# Patient Record
Sex: FEMALE | Race: WHITE | NOT HISPANIC OR LATINO | Employment: FULL TIME | URBAN - METROPOLITAN AREA
[De-identification: names, ages, dates, MRNs, and addresses within clinical notes are randomized per-mention and may not be internally consistent; named-entity substitution may affect disease eponyms.]

---

## 2018-08-05 ENCOUNTER — OFFICE VISIT (OUTPATIENT)
Dept: RETAIL CLINIC | Facility: CLINIC | Age: 58
End: 2018-08-05

## 2018-08-05 VITALS
TEMPERATURE: 99.1 F | SYSTOLIC BLOOD PRESSURE: 122 MMHG | OXYGEN SATURATION: 94 % | DIASTOLIC BLOOD PRESSURE: 70 MMHG | RESPIRATION RATE: 18 BRPM | HEART RATE: 83 BPM

## 2018-08-05 DIAGNOSIS — J06.9 ACUTE URI: ICD-10-CM

## 2018-08-05 DIAGNOSIS — H65.03 BILATERAL ACUTE SEROUS OTITIS MEDIA, RECURRENCE NOT SPECIFIED: ICD-10-CM

## 2018-08-05 DIAGNOSIS — J40 BRONCHITIS: Primary | ICD-10-CM

## 2018-08-05 PROCEDURE — 99203 OFFICE O/P NEW LOW 30 MIN: CPT | Performed by: NURSE PRACTITIONER

## 2018-08-05 RX ORDER — ESCITALOPRAM OXALATE 10 MG/1
10 TABLET ORAL DAILY
COMMUNITY

## 2018-08-05 RX ORDER — MONTELUKAST SODIUM 4 MG/1
4 TABLET, CHEWABLE ORAL NIGHTLY
COMMUNITY
End: 2019-11-19

## 2018-08-05 RX ORDER — ALBUTEROL SULFATE 90 UG/1
2 AEROSOL, METERED RESPIRATORY (INHALATION) EVERY 4 HOURS PRN
Qty: 1 INHALER | Refills: 0 | Status: SHIPPED | OUTPATIENT
Start: 2018-08-05 | End: 2018-09-04

## 2018-08-05 RX ORDER — SIMVASTATIN 5 MG
5 TABLET ORAL NIGHTLY
COMMUNITY
End: 2019-11-19

## 2018-08-05 NOTE — PROGRESS NOTES
Subjective   Patient ID: Amanda Tom is a 58 y.o. female presents with   Chief Complaint   Patient presents with   • Bronchitis     3 days       Bronchitis   This is a new problem. The current episode started in the past 7 days (2d). The problem occurs constantly. The problem has been gradually worsening. Associated symptoms include chills, congestion, coughing (green), diaphoresis, fatigue, a fever (low grade), headaches and nausea (from drainage). Pertinent negatives include no sore throat or vomiting. She has tried nothing for the symptoms. The treatment provided no relief.       No Known Allergies    The following portions of the patient's history were reviewed and updated as appropriate: allergies, current medications, past family history, past medical history, past social history, past surgical history and problem list.      Review of Systems   Constitutional: Positive for chills, diaphoresis, fatigue and fever (low grade).   HENT: Positive for congestion, postnasal drip, rhinorrhea, sinus pain, sinus pressure and sneezing. Negative for ear pain and sore throat.    Respiratory: Positive for cough (green), chest tightness, shortness of breath and wheezing.    Cardiovascular: Negative.    Gastrointestinal: Positive for nausea (from drainage). Negative for diarrhea and vomiting.   Neurological: Positive for headaches.       Objective     Vitals:    08/05/18 1700   BP: 122/70   Pulse: 83   Resp: 18   Temp: 99.1 °F (37.3 °C)   SpO2: 94%         Physical Exam   Constitutional: She is oriented to person, place, and time. She appears well-developed and well-nourished. She does not appear ill. No distress.   HENT:   Head: Normocephalic.   Right Ear: Hearing, external ear and ear canal normal. A middle ear effusion is present.   Left Ear: Hearing, external ear and ear canal normal. A middle ear effusion is present.   Nose: Mucosal edema and sinus tenderness present. No rhinorrhea. Right sinus exhibits frontal sinus  tenderness. Left sinus exhibits frontal sinus tenderness.   Mouth/Throat: Oropharynx is clear and moist and mucous membranes are normal. No tonsillar exudate.   Eyes: Conjunctivae are normal.   Sclera white.   Neck: No tracheal deviation present.   Cardiovascular: Normal rate, regular rhythm, S1 normal, S2 normal and normal heart sounds.    Pulmonary/Chest: Effort normal. No accessory muscle usage. No respiratory distress. She has decreased breath sounds (mild) in the right upper field, the right middle field, the right lower field, the left upper field, the left middle field and the left lower field. She has no wheezes. She has no rhonchi. She has no rales.   Abdominal: Soft. Bowel sounds are normal. There is no tenderness.   Lymphadenopathy:     She has no cervical adenopathy.   Neurological: She is alert and oriented to person, place, and time.   Skin: Skin is warm and dry.   Vitals reviewed.        Amanda was seen today for bronchitis.    Diagnoses and all orders for this visit:    Bronchitis  -     PredniSONE 5 MG tablet therapy pack dosepak; Take as directed on package  -     albuterol (PROVENTIL HFA;VENTOLIN HFA) 108 (90 Base) MCG/ACT inhaler; Inhale 2 puffs Every 4 (Four) Hours As Needed for Wheezing or Shortness of Air for up to 30 days.    Acute URI    Bilateral acute serous otitis media, recurrence not specified  -     PredniSONE 5 MG tablet therapy pack dosepak; Take as directed on package        Follow-up with Primary Care Physician in 48-72 hours if these symptoms worsen or fail to improve as anticipated. Patient verbalizes understanding.    Otitis Media With Effusion  Otitis media with effusion is the presence of fluid in the middle ear. This is a common problem in children, which often follows ear infections. It may be present for weeks or longer after the infection. Unlike an acute ear infection, otitis media with effusion refers only to fluid behind the ear drum and not infection. Children with  "repeated ear and sinus infections and allergy problems are the most likely to get otitis media with effusion.  CAUSES   The most frequent cause of the fluid buildup is dysfunction of the eustachian tubes. These are the tubes that drain fluid in the ears to the back of the nose (nasopharynx).  SYMPTOMS   · The main symptom of this condition is hearing loss. As a result, you or your child may:  ¨ Listen to the TV at a loud volume.  ¨ Not respond to questions.  ¨ Ask \"what\" often when spoken to.  ¨ Mistake or confuse one sound or word for another.  · There may be a sensation of fullness or pressure but usually not pain.  DIAGNOSIS   · Your health care provider will diagnose this condition by examining you or your child's ears.  · Your health care provider may test the pressure in you or your child's ear with a tympanometer.  · A hearing test may be conducted if the problem persists.  TREATMENT   · Treatment depends on the duration and the effects of the effusion.  · Antibiotics, decongestants, nose drops, and cortisone-type drugs (tablets or nasal spray) may not be helpful.  · Children with persistent ear effusions may have delayed language or behavioral problems. Children at risk for developmental delays in hearing, learning, and speech may require referral to a specialist earlier than children not at risk.  · You or your child's health care provider may suggest a referral to an ear, nose, and throat surgeon for treatment. The following may help restore normal hearing:  ¨ Drainage of fluid.  ¨ Placement of ear tubes (tympanostomy tubes).  ¨ Removal of adenoids (adenoidectomy).  HOME CARE INSTRUCTIONS   · Avoid secondhand smoke.  · Infants who are  are less likely to have this condition.  · Avoid feeding infants while they are lying flat.  · Avoid known environmental allergens.  · Avoid people who are sick.  SEEK MEDICAL CARE IF:   · Hearing is not better in 3 months.  · Hearing is worse.  · Ear " "pain.  · Drainage from the ear.  · Dizziness.  MAKE SURE YOU:   · Understand these instructions.  · Will watch your condition.  · Will get help right away if you are not doing well or get worse.  This information is not intended to replace advice given to you by your health care provider. Make sure you discuss any questions you have with your health care provider.  Document Released: 01/25/2006 Document Revised: 01/08/2016 Document Reviewed: 07/15/2014  [employer] Interactive Patient Education © 2017 [employer]  Upper Respiratory Infection, Adult  Most upper respiratory infections (URIs) are a viral infection of the air passages leading to the lungs. A URI affects the nose, throat, and upper air passages. The most common type of URI is nasopharyngitis and is typically referred to as \"the common cold.\"  URIs run their course and usually go away on their own. Most of the time, a URI does not require medical attention, but sometimes a bacterial infection in the upper airways can follow a viral infection. This is called a secondary infection. Sinus and middle ear infections are common types of secondary upper respiratory infections.  Bacterial pneumonia can also complicate a URI. A URI can worsen asthma and chronic obstructive pulmonary disease (COPD). Sometimes, these complications can require emergency medical care and may be life threatening.  What are the causes?  Almost all URIs are caused by viruses. A virus is a type of germ and can spread from one person to another.  What increases the risk?  You may be at risk for a URI if:  · You smoke.  · You have chronic heart or lung disease.  · You have a weakened defense (immune) system.  · You are very young or very old.  · You have nasal allergies or asthma.  · You work in crowded or poorly ventilated areas.  · You work in health care facilities or schools.    What are the signs or symptoms?  Symptoms typically develop 2-3 days after you come in contact with a cold " virus. Most viral URIs last 7-10 days. However, viral URIs from the influenza virus (flu virus) can last 14-18 days and are typically more severe. Symptoms may include:  · Runny or stuffy (congested) nose.  · Sneezing.  · Cough.  · Sore throat.  · Headache.  · Fatigue.  · Fever.  · Loss of appetite.  · Pain in your forehead, behind your eyes, and over your cheekbones (sinus pain).  · Muscle aches.    How is this diagnosed?  Your health care provider may diagnose a URI by:  · Physical exam.  · Tests to check that your symptoms are not due to another condition such as:  ? Strep throat.  ? Sinusitis.  ? Pneumonia.  ? Asthma.    How is this treated?  A URI goes away on its own with time. It cannot be cured with medicines, but medicines may be prescribed or recommended to relieve symptoms. Medicines may help:  · Reduce your fever.  · Reduce your cough.  · Relieve nasal congestion.    Follow these instructions at home:  · Take medicines only as directed by your health care provider.  · Gargle warm saltwater or take cough drops to comfort your throat as directed by your health care provider.  · Use a warm mist humidifier or inhale steam from a shower to increase air moisture. This may make it easier to breathe.  · Drink enough fluid to keep your urine clear or pale yellow.  · Eat soups and other clear broths and maintain good nutrition.  · Rest as needed.  · Return to work when your temperature has returned to normal or as your health care provider advises. You may need to stay home longer to avoid infecting others. You can also use a face mask and careful hand washing to prevent spread of the virus.  · Increase the usage of your inhaler if you have asthma.  · Do not use any tobacco products, including cigarettes, chewing tobacco, or electronic cigarettes. If you need help quitting, ask your health care provider.  How is this prevented?  The best way to protect yourself from getting a cold is to practice good  hygiene.  · Avoid oral or hand contact with people with cold symptoms.  · Wash your hands often if contact occurs.    There is no clear evidence that vitamin C, vitamin E, echinacea, or exercise reduces the chance of developing a cold. However, it is always recommended to get plenty of rest, exercise, and practice good nutrition.  Contact a health care provider if:  · You are getting worse rather than better.  · Your symptoms are not controlled by medicine.  · You have chills.  · You have worsening shortness of breath.  · You have brown or red mucus.  · You have yellow or brown nasal discharge.  · You have pain in your face, especially when you bend forward.  · You have a fever.  · You have swollen neck glands.  · You have pain while swallowing.  · You have white areas in the back of your throat.  Get help right away if:  · You have severe or persistent:  ? Headache.  ? Ear pain.  ? Sinus pain.  ? Chest pain.  · You have chronic lung disease and any of the following:  ? Wheezing.  ? Prolonged cough.  ? Coughing up blood.  ? A change in your usual mucus.  · You have a stiff neck.  · You have changes in your:  ? Vision.  ? Hearing.  ? Thinking.  ? Mood.  This information is not intended to replace advice given to you by your health care provider. Make sure you discuss any questions you have with your health care provider.  Document Released: 06/13/2002 Document Revised: 08/20/2017 Document Reviewed: 03/25/2015  "[employer] Interactive Patient Education © 2018 "[employer]  Acute Bronchitis, Adult  Acute bronchitis is sudden (acute) swelling of the air tubes (bronchi) in the lungs. Acute bronchitis causes these tubes to fill with mucus, which can make it hard to breathe. It can also cause coughing or wheezing.  In adults, acute bronchitis usually goes away within 2 weeks. A cough caused by bronchitis may last up to 3 weeks. Smoking, allergies, and asthma can make the condition worse. Repeated episodes of bronchitis may  cause further lung problems, such as chronic obstructive pulmonary disease (COPD).  What are the causes?  This condition can be caused by germs and by substances that irritate the lungs, including:  · Cold and flu viruses. This condition is most often caused by the same virus that causes a cold.  · Bacteria.  · Exposure to tobacco smoke, dust, fumes, and air pollution.    What increases the risk?  This condition is more likely to develop in people who:  · Have close contact with someone with acute bronchitis.  · Are exposed to lung irritants, such as tobacco smoke, dust, fumes, and vapors.  · Have a weak immune system.  · Have a respiratory condition such as asthma.    What are the signs or symptoms?  Symptoms of this condition include:  · A cough.  · Coughing up clear, yellow, or green mucus.  · Wheezing.  · Chest congestion.  · Shortness of breath.  · A fever.  · Body aches.  · Chills.  · A sore throat.    How is this diagnosed?  This condition is usually diagnosed with a physical exam. During the exam, your health care provider may order tests, such as chest X-rays, to rule out other conditions. He or she may also:  · Test a sample of your mucus for bacterial infection.  · Check the level of oxygen in your blood. This is done to check for pneumonia.  · Do a chest X-ray or lung function testing to rule out pneumonia and other conditions.  · Perform blood tests.    Your health care provider will also ask about your symptoms and medical history.  How is this treated?  Most cases of acute bronchitis clear up over time without treatment. Your health care provider may recommend:  · Drinking more fluids. Drinking more makes your mucus thinner, which may make it easier to breathe.  · Taking a medicine for a fever or cough.  · Taking an antibiotic medicine.  · Using an inhaler to help improve shortness of breath and to control a cough.  · Using a cool mist vaporizer or humidifier to make it easier to breathe.    Follow  these instructions at home:  Medicines  · Take over-the-counter and prescription medicines only as told by your health care provider.  · If you were prescribed an antibiotic, take it as told by your health care provider. Do not stop taking the antibiotic even if you start to feel better.  General instructions  · Get plenty of rest.  · Drink enough fluids to keep your urine clear or pale yellow.  · Avoid smoking and secondhand smoke. Exposure to cigarette smoke or irritating chemicals will make bronchitis worse. If you smoke and you need help quitting, ask your health care provider. Quitting smoking will help your lungs heal faster.  · Use an inhaler, cool mist vaporizer, or humidifier as told by your health care provider.  · Keep all follow-up visits as told by your health care provider. This is important.  How is this prevented?  To lower your risk of getting this condition again:  · Wash your hands often with soap and water. If soap and water are not available, use hand .  · Avoid contact with people who have cold symptoms.  · Try not to touch your hands to your mouth, nose, or eyes.  · Make sure to get the flu shot every year.    Contact a health care provider if:  · Your symptoms do not improve in 2 weeks of treatment.  Get help right away if:  · You cough up blood.  · You have chest pain.  · You have severe shortness of breath.  · You become dehydrated.  · You faint or keep feeling like you are going to faint.  · You keep vomiting.  · You have a severe headache.  · Your fever or chills gets worse.  This information is not intended to replace advice given to you by your health care provider. Make sure you discuss any questions you have with your health care provider.  Document Released: 01/25/2006 Document Revised: 07/12/2017 Document Reviewed: 06/07/2017  [employer] Interactive Patient Education © 2018 [employer]

## 2018-08-05 NOTE — PATIENT INSTRUCTIONS
"Otitis Media With Effusion  Otitis media with effusion is the presence of fluid in the middle ear. This is a common problem in children, which often follows ear infections. It may be present for weeks or longer after the infection. Unlike an acute ear infection, otitis media with effusion refers only to fluid behind the ear drum and not infection. Children with repeated ear and sinus infections and allergy problems are the most likely to get otitis media with effusion.  CAUSES   The most frequent cause of the fluid buildup is dysfunction of the eustachian tubes. These are the tubes that drain fluid in the ears to the back of the nose (nasopharynx).  SYMPTOMS   · The main symptom of this condition is hearing loss. As a result, you or your child may:  ¨ Listen to the TV at a loud volume.  ¨ Not respond to questions.  ¨ Ask \"what\" often when spoken to.  ¨ Mistake or confuse one sound or word for another.  · There may be a sensation of fullness or pressure but usually not pain.  DIAGNOSIS   · Your health care provider will diagnose this condition by examining you or your child's ears.  · Your health care provider may test the pressure in you or your child's ear with a tympanometer.  · A hearing test may be conducted if the problem persists.  TREATMENT   · Treatment depends on the duration and the effects of the effusion.  · Antibiotics, decongestants, nose drops, and cortisone-type drugs (tablets or nasal spray) may not be helpful.  · Children with persistent ear effusions may have delayed language or behavioral problems. Children at risk for developmental delays in hearing, learning, and speech may require referral to a specialist earlier than children not at risk.  · You or your child's health care provider may suggest a referral to an ear, nose, and throat surgeon for treatment. The following may help restore normal hearing:  ¨ Drainage of fluid.  ¨ Placement of ear tubes (tympanostomy tubes).  ¨ Removal of adenoids " "(adenoidectomy).  HOME CARE INSTRUCTIONS   · Avoid secondhand smoke.  · Infants who are  are less likely to have this condition.  · Avoid feeding infants while they are lying flat.  · Avoid known environmental allergens.  · Avoid people who are sick.  SEEK MEDICAL CARE IF:   · Hearing is not better in 3 months.  · Hearing is worse.  · Ear pain.  · Drainage from the ear.  · Dizziness.  MAKE SURE YOU:   · Understand these instructions.  · Will watch your condition.  · Will get help right away if you are not doing well or get worse.  This information is not intended to replace advice given to you by your health care provider. Make sure you discuss any questions you have with your health care provider.  Document Released: 01/25/2006 Document Revised: 01/08/2016 Document Reviewed: 07/15/2014  [employer] Interactive Patient Education © 2017 [employer]  Upper Respiratory Infection, Adult  Most upper respiratory infections (URIs) are a viral infection of the air passages leading to the lungs. A URI affects the nose, throat, and upper air passages. The most common type of URI is nasopharyngitis and is typically referred to as \"the common cold.\"  URIs run their course and usually go away on their own. Most of the time, a URI does not require medical attention, but sometimes a bacterial infection in the upper airways can follow a viral infection. This is called a secondary infection. Sinus and middle ear infections are common types of secondary upper respiratory infections.  Bacterial pneumonia can also complicate a URI. A URI can worsen asthma and chronic obstructive pulmonary disease (COPD). Sometimes, these complications can require emergency medical care and may be life threatening.  What are the causes?  Almost all URIs are caused by viruses. A virus is a type of germ and can spread from one person to another.  What increases the risk?  You may be at risk for a URI if:  · You smoke.  · You have chronic heart or " lung disease.  · You have a weakened defense (immune) system.  · You are very young or very old.  · You have nasal allergies or asthma.  · You work in crowded or poorly ventilated areas.  · You work in health care facilities or schools.    What are the signs or symptoms?  Symptoms typically develop 2-3 days after you come in contact with a cold virus. Most viral URIs last 7-10 days. However, viral URIs from the influenza virus (flu virus) can last 14-18 days and are typically more severe. Symptoms may include:  · Runny or stuffy (congested) nose.  · Sneezing.  · Cough.  · Sore throat.  · Headache.  · Fatigue.  · Fever.  · Loss of appetite.  · Pain in your forehead, behind your eyes, and over your cheekbones (sinus pain).  · Muscle aches.    How is this diagnosed?  Your health care provider may diagnose a URI by:  · Physical exam.  · Tests to check that your symptoms are not due to another condition such as:  ? Strep throat.  ? Sinusitis.  ? Pneumonia.  ? Asthma.    How is this treated?  A URI goes away on its own with time. It cannot be cured with medicines, but medicines may be prescribed or recommended to relieve symptoms. Medicines may help:  · Reduce your fever.  · Reduce your cough.  · Relieve nasal congestion.    Follow these instructions at home:  · Take medicines only as directed by your health care provider.  · Gargle warm saltwater or take cough drops to comfort your throat as directed by your health care provider.  · Use a warm mist humidifier or inhale steam from a shower to increase air moisture. This may make it easier to breathe.  · Drink enough fluid to keep your urine clear or pale yellow.  · Eat soups and other clear broths and maintain good nutrition.  · Rest as needed.  · Return to work when your temperature has returned to normal or as your health care provider advises. You may need to stay home longer to avoid infecting others. You can also use a face mask and careful hand washing to prevent  spread of the virus.  · Increase the usage of your inhaler if you have asthma.  · Do not use any tobacco products, including cigarettes, chewing tobacco, or electronic cigarettes. If you need help quitting, ask your health care provider.  How is this prevented?  The best way to protect yourself from getting a cold is to practice good hygiene.  · Avoid oral or hand contact with people with cold symptoms.  · Wash your hands often if contact occurs.    There is no clear evidence that vitamin C, vitamin E, echinacea, or exercise reduces the chance of developing a cold. However, it is always recommended to get plenty of rest, exercise, and practice good nutrition.  Contact a health care provider if:  · You are getting worse rather than better.  · Your symptoms are not controlled by medicine.  · You have chills.  · You have worsening shortness of breath.  · You have brown or red mucus.  · You have yellow or brown nasal discharge.  · You have pain in your face, especially when you bend forward.  · You have a fever.  · You have swollen neck glands.  · You have pain while swallowing.  · You have white areas in the back of your throat.  Get help right away if:  · You have severe or persistent:  ? Headache.  ? Ear pain.  ? Sinus pain.  ? Chest pain.  · You have chronic lung disease and any of the following:  ? Wheezing.  ? Prolonged cough.  ? Coughing up blood.  ? A change in your usual mucus.  · You have a stiff neck.  · You have changes in your:  ? Vision.  ? Hearing.  ? Thinking.  ? Mood.  This information is not intended to replace advice given to you by your health care provider. Make sure you discuss any questions you have with your health care provider.  Document Released: 06/13/2002 Document Revised: 08/20/2017 Document Reviewed: 03/25/2015  [employer] Interactive Patient Education © 2018 [employer]  Acute Bronchitis, Adult  Acute bronchitis is sudden (acute) swelling of the air tubes (bronchi) in the lungs. Acute  bronchitis causes these tubes to fill with mucus, which can make it hard to breathe. It can also cause coughing or wheezing.  In adults, acute bronchitis usually goes away within 2 weeks. A cough caused by bronchitis may last up to 3 weeks. Smoking, allergies, and asthma can make the condition worse. Repeated episodes of bronchitis may cause further lung problems, such as chronic obstructive pulmonary disease (COPD).  What are the causes?  This condition can be caused by germs and by substances that irritate the lungs, including:  · Cold and flu viruses. This condition is most often caused by the same virus that causes a cold.  · Bacteria.  · Exposure to tobacco smoke, dust, fumes, and air pollution.    What increases the risk?  This condition is more likely to develop in people who:  · Have close contact with someone with acute bronchitis.  · Are exposed to lung irritants, such as tobacco smoke, dust, fumes, and vapors.  · Have a weak immune system.  · Have a respiratory condition such as asthma.    What are the signs or symptoms?  Symptoms of this condition include:  · A cough.  · Coughing up clear, yellow, or green mucus.  · Wheezing.  · Chest congestion.  · Shortness of breath.  · A fever.  · Body aches.  · Chills.  · A sore throat.    How is this diagnosed?  This condition is usually diagnosed with a physical exam. During the exam, your health care provider may order tests, such as chest X-rays, to rule out other conditions. He or she may also:  · Test a sample of your mucus for bacterial infection.  · Check the level of oxygen in your blood. This is done to check for pneumonia.  · Do a chest X-ray or lung function testing to rule out pneumonia and other conditions.  · Perform blood tests.    Your health care provider will also ask about your symptoms and medical history.  How is this treated?  Most cases of acute bronchitis clear up over time without treatment. Your health care provider may  recommend:  · Drinking more fluids. Drinking more makes your mucus thinner, which may make it easier to breathe.  · Taking a medicine for a fever or cough.  · Taking an antibiotic medicine.  · Using an inhaler to help improve shortness of breath and to control a cough.  · Using a cool mist vaporizer or humidifier to make it easier to breathe.    Follow these instructions at home:  Medicines  · Take over-the-counter and prescription medicines only as told by your health care provider.  · If you were prescribed an antibiotic, take it as told by your health care provider. Do not stop taking the antibiotic even if you start to feel better.  General instructions  · Get plenty of rest.  · Drink enough fluids to keep your urine clear or pale yellow.  · Avoid smoking and secondhand smoke. Exposure to cigarette smoke or irritating chemicals will make bronchitis worse. If you smoke and you need help quitting, ask your health care provider. Quitting smoking will help your lungs heal faster.  · Use an inhaler, cool mist vaporizer, or humidifier as told by your health care provider.  · Keep all follow-up visits as told by your health care provider. This is important.  How is this prevented?  To lower your risk of getting this condition again:  · Wash your hands often with soap and water. If soap and water are not available, use hand .  · Avoid contact with people who have cold symptoms.  · Try not to touch your hands to your mouth, nose, or eyes.  · Make sure to get the flu shot every year.    Contact a health care provider if:  · Your symptoms do not improve in 2 weeks of treatment.  Get help right away if:  · You cough up blood.  · You have chest pain.  · You have severe shortness of breath.  · You become dehydrated.  · You faint or keep feeling like you are going to faint.  · You keep vomiting.  · You have a severe headache.  · Your fever or chills gets worse.  This information is not intended to replace advice given  to you by your health care provider. Make sure you discuss any questions you have with your health care provider.  Document Released: 01/25/2006 Document Revised: 07/12/2017 Document Reviewed: 06/07/2017  [employer] Interactive Patient Education © 2018 [employer]

## 2018-09-10 ENCOUNTER — OFFICE VISIT (OUTPATIENT)
Dept: RETAIL CLINIC | Facility: CLINIC | Age: 58
End: 2018-09-10

## 2018-09-10 VITALS
SYSTOLIC BLOOD PRESSURE: 116 MMHG | HEART RATE: 70 BPM | OXYGEN SATURATION: 97 % | RESPIRATION RATE: 18 BRPM | DIASTOLIC BLOOD PRESSURE: 80 MMHG | TEMPERATURE: 98.4 F

## 2018-09-10 DIAGNOSIS — H66.91 ACUTE RIGHT OTITIS MEDIA: ICD-10-CM

## 2018-09-10 DIAGNOSIS — H60.502 ACUTE OTITIS EXTERNA OF LEFT EAR, UNSPECIFIED TYPE: Primary | ICD-10-CM

## 2018-09-10 DIAGNOSIS — J30.9 ACUTE ALLERGIC RHINITIS, UNSPECIFIED SEASONALITY, UNSPECIFIED TRIGGER: ICD-10-CM

## 2018-09-10 PROCEDURE — 99213 OFFICE O/P EST LOW 20 MIN: CPT | Performed by: NURSE PRACTITIONER

## 2018-09-10 RX ORDER — AMOXICILLIN 875 MG/1
875 TABLET, COATED ORAL EVERY 12 HOURS SCHEDULED
Qty: 20 TABLET | Refills: 0 | Status: SHIPPED | OUTPATIENT
Start: 2018-09-10 | End: 2018-09-20

## 2018-09-10 RX ORDER — FEXOFENADINE HCL AND PSEUDOEPHEDRINE HCI 60; 120 MG/1; MG/1
1 TABLET, EXTENDED RELEASE ORAL 2 TIMES DAILY
COMMUNITY
End: 2019-11-19

## 2018-09-10 RX ORDER — OFLOXACIN 3 MG/ML
10 SOLUTION AURICULAR (OTIC) DAILY
Qty: 10 ML | Refills: 0 | Status: SHIPPED | OUTPATIENT
Start: 2018-09-10 | End: 2018-09-17

## 2018-09-10 NOTE — PATIENT INSTRUCTIONS
"Otitis Externa  Otitis externa is an infection of the outer ear canal. The outer ear canal is the area between the outside of the ear and the eardrum. Otitis externa is sometimes called \"swimmer's ear.\"  What are the causes?  This condition may be caused by:  · Swimming in dirty water.  · Moisture in the ear.  · An injury to the inside of the ear.  · An object stuck in the ear.  · A cut or scrape on the outside of the ear.    What increases the risk?  This condition is more likely to develop in swimmers.  What are the signs or symptoms?  The first symptom of this condition is often itching in the ear. Later signs and symptoms include:  · Swelling of the ear.  · Redness in the ear.  · Ear pain. The pain may get worse when you pull on your ear.  · Pus coming from the ear.    How is this diagnosed?  This condition may be diagnosed by examining the ear and testing fluid from the ear for bacteria and funguses.  How is this treated?  This condition may be treated with:  · Antibiotic ear drops. These are often given for 10-14 days.  · Medicine to reduce itching and swelling.    Follow these instructions at home:  · If you were prescribed antibiotic ear drops, apply them as told by your health care provider. Do not stop using the antibiotic even if your condition improves.  · Take over-the-counter and prescription medicines only as told by your health care provider.  · Keep all follow-up visits as told by your health care provider. This is important.  How is this prevented?  · Keep your ear dry. Use the corner of a towel to dry your ear after you swim or bathe.  · Avoid scratching or putting things in your ear. Doing these things can damage the ear canal or remove the protective wax that lines it, which makes it easier for bacteria and funguses to grow.  · Avoid swimming in lakes, polluted water, or pools that may not have the right amount of chlorine.  · Consider making ear drops and putting 3 or 4 drops in each ear after " you swim. Ask your health care provider about how you can make ear drops.  Contact a health care provider if:  · You have a fever.  · After 3 days your ear is still red, swollen, painful, or draining pus.  · Your redness, swelling, or pain gets worse.  · You have a severe headache.  · You have redness, swelling, pain, or tenderness in the area behind your ear.  This information is not intended to replace advice given to you by your health care provider. Make sure you discuss any questions you have with your health care provider.  Document Released: 12/18/2006 Document Revised: 01/24/2017 Document Reviewed: 09/26/2016  [employer] Interactive Patient Education © 2018 [employer]  Otitis Media, Adult  Otitis media occurs when there is inflammation and fluid in the middle ear. Your middle ear is a part of the ear that contains bones for hearing as well as air that helps send sounds to your brain.  What are the causes?  This condition is caused by a blockage in the eustachian tube. This tube drains fluid from the ear to the back of the nose (nasopharynx). A blockage in this tube can be caused by an object or by swelling (edema) in the tube. Problems that can cause a blockage include:  · A cold or other upper respiratory infection.  · Allergies.  · An irritant, such as tobacco smoke.  · Enlarged adenoids. The adenoids are areas of soft tissue located high in the back of the throat, behind the nose and the roof of the mouth.  · A mass in the nasopharynx.  · Damage to the ear caused by pressure changes (barotrauma).    What are the signs or symptoms?  Symptoms of this condition include:  · Ear pain.  · A fever.  · Decreased hearing.  · A headache.  · Tiredness (lethargy).  · Fluid leaking from the ear.  · Ringing in the ear.    How is this diagnosed?  This condition is diagnosed with a physical exam. During the exam your health care provider will use an instrument called an otoscope to look into your ear and check for  redness, swelling, and fluid. He or she will also ask about your symptoms.  Your health care provider may also order tests, such as:  · A test to check the movement of the eardrum (pneumatic otoscopy). This test is done by squeezing a small amount of air into the ear.  · A test that changes air pressure in the middle ear to check how well the eardrum moves and whether the eustachian tube is working (tympanogram).    How is this treated?  This condition usually goes away on its own within 3-5 days. But if the condition is caused by a bacteria infection and does not go away own its own, or keeps coming back, your health care provider may:  · Prescribe antibiotic medicines to treat the infection.  · Prescribe or recommend medicines to control pain.    Follow these instructions at home:  · Take over-the-counter and prescription medicines only as told by your health care provider.  · If you were prescribed an antibiotic medicine, take it as told by your health care provider. Do not stop taking the antibiotic even if you start to feel better.  · Keep all follow-up visits as told by your health care provider. This is important.  Contact a health care provider if:  · You have bleeding from your nose.  · There is a lump on your neck.  · You are not getting better in 5 days.  · You feel worse instead of better.  Get help right away if:  · You have severe pain that is not controlled with medicine.  · You have swelling, redness, or pain around your ear.  · You have stiffness in your neck.  · A part of your face is paralyzed.  · The bone behind your ear (mastoid) is tender when you touch it.  · You develop a severe headache.  Summary  · Otitis media is redness, soreness, and swelling of the middle ear.  · This condition usually goes away on its own within 3-5 days.  · If the problem does not go away in 3-5 days, your health care provider may prescribe or recommend medicines to treat your symptoms.  · If you were prescribed an  antibiotic medicine, take it as told by your health care provider.  This information is not intended to replace advice given to you by your health care provider. Make sure you discuss any questions you have with your health care provider.  Document Released: 09/22/2005 Document Revised: 12/08/2017 Document Reviewed: 12/08/2017  [employer] Interactive Patient Education © 2018 [employer]

## 2018-09-10 NOTE — PROGRESS NOTES
Subjective   Patient ID: Amanda Tom is a 58 y.o. female presents with   Chief Complaint   Patient presents with   • Earache       Earache    There is pain in the left ear. This is a new problem. The current episode started in the past 7 days. The problem occurs constantly. The problem has been gradually worsening. There has been no fever. The pain is at a severity of 6/10. Associated symptoms include headaches, rhinorrhea and a sore throat. Treatments tried: allegra d. The treatment provided no relief. Her past medical history is significant for hearing loss. There is no history of a chronic ear infection or a tympanostomy tube.       No Known Allergies    The following portions of the patient's history were reviewed and updated as appropriate: allergies, current medications, past family history, past medical history, past social history, past surgical history and problem list.      Review of Systems   Constitutional: Negative.    HENT: Positive for congestion, ear pain, postnasal drip, rhinorrhea, sinus pressure, sneezing and sore throat.    Respiratory: Negative.    Cardiovascular: Negative.    Gastrointestinal: Negative.    Neurological: Positive for headaches.       Objective     Vitals:    09/10/18 1744   BP: 116/80   Pulse: 70   Resp: 18   Temp: 98.4 °F (36.9 °C)   SpO2: 97%         Physical Exam   Constitutional: She is oriented to person, place, and time. She appears well-developed and well-nourished. She does not appear ill. No distress.   HENT:   Head: Normocephalic.   Right Ear: Hearing, external ear and ear canal normal. Tympanic membrane is erythematous.   Left Ear: External ear normal. There is drainage, swelling and tenderness. Decreased hearing is noted.   Nose: Mucosal edema present. No rhinorrhea or sinus tenderness.   Mouth/Throat: Oropharynx is clear and moist and mucous membranes are normal. No tonsillar exudate.   Left ear canal with white exudate, edema, erythema, pain elicited when earlobe  "pulled. Left TM not visualized due to edema.     Eyes: Conjunctivae are normal.   Sclera white.   Neck: No tracheal deviation present.   Cardiovascular: Normal rate, regular rhythm, S1 normal, S2 normal and normal heart sounds.    Pulmonary/Chest: Effort normal and breath sounds normal. No accessory muscle usage. No respiratory distress.   Abdominal: Soft. Bowel sounds are normal. There is no tenderness.   Lymphadenopathy:     She has no cervical adenopathy.   Neurological: She is alert and oriented to person, place, and time.   Skin: Skin is warm and dry.   Vitals reviewed.        Amanda was seen today for earache.    Diagnoses and all orders for this visit:    Acute otitis externa of left ear, unspecified type  -     ofloxacin (FLOXIN OTIC) 0.3 % otic solution; Administer 10 drops into the left ear Daily for 7 days.    Acute right otitis media  -     amoxicillin (AMOXIL) 875 MG tablet; Take 1 tablet by mouth Every 12 (Twelve) Hours for 10 days.    Acute allergic rhinitis, unspecified seasonality, unspecified trigger        No water in left ear x 7 days. Follow-up with Primary Care Physician in 48-72 hours if these symptoms worsen or fail to improve as anticipated. Patient verbalizes understanding.    Otitis Externa  Otitis externa is an infection of the outer ear canal. The outer ear canal is the area between the outside of the ear and the eardrum. Otitis externa is sometimes called \"swimmer's ear.\"  What are the causes?  This condition may be caused by:  · Swimming in dirty water.  · Moisture in the ear.  · An injury to the inside of the ear.  · An object stuck in the ear.  · A cut or scrape on the outside of the ear.    What increases the risk?  This condition is more likely to develop in swimmers.  What are the signs or symptoms?  The first symptom of this condition is often itching in the ear. Later signs and symptoms include:  · Swelling of the ear.  · Redness in the ear.  · Ear pain. The pain may get worse " when you pull on your ear.  · Pus coming from the ear.    How is this diagnosed?  This condition may be diagnosed by examining the ear and testing fluid from the ear for bacteria and funguses.  How is this treated?  This condition may be treated with:  · Antibiotic ear drops. These are often given for 10-14 days.  · Medicine to reduce itching and swelling.    Follow these instructions at home:  · If you were prescribed antibiotic ear drops, apply them as told by your health care provider. Do not stop using the antibiotic even if your condition improves.  · Take over-the-counter and prescription medicines only as told by your health care provider.  · Keep all follow-up visits as told by your health care provider. This is important.  How is this prevented?  · Keep your ear dry. Use the corner of a towel to dry your ear after you swim or bathe.  · Avoid scratching or putting things in your ear. Doing these things can damage the ear canal or remove the protective wax that lines it, which makes it easier for bacteria and funguses to grow.  · Avoid swimming in lakes, polluted water, or pools that may not have the right amount of chlorine.  · Consider making ear drops and putting 3 or 4 drops in each ear after you swim. Ask your health care provider about how you can make ear drops.  Contact a health care provider if:  · You have a fever.  · After 3 days your ear is still red, swollen, painful, or draining pus.  · Your redness, swelling, or pain gets worse.  · You have a severe headache.  · You have redness, swelling, pain, or tenderness in the area behind your ear.  This information is not intended to replace advice given to you by your health care provider. Make sure you discuss any questions you have with your health care provider.  Document Released: 12/18/2006 Document Revised: 01/24/2017 Document Reviewed: 09/26/2016  [employer] Interactive Patient Education © 2018 [employer]  Otitis Media, Adult  Otitis media  occurs when there is inflammation and fluid in the middle ear. Your middle ear is a part of the ear that contains bones for hearing as well as air that helps send sounds to your brain.  What are the causes?  This condition is caused by a blockage in the eustachian tube. This tube drains fluid from the ear to the back of the nose (nasopharynx). A blockage in this tube can be caused by an object or by swelling (edema) in the tube. Problems that can cause a blockage include:  · A cold or other upper respiratory infection.  · Allergies.  · An irritant, such as tobacco smoke.  · Enlarged adenoids. The adenoids are areas of soft tissue located high in the back of the throat, behind the nose and the roof of the mouth.  · A mass in the nasopharynx.  · Damage to the ear caused by pressure changes (barotrauma).    What are the signs or symptoms?  Symptoms of this condition include:  · Ear pain.  · A fever.  · Decreased hearing.  · A headache.  · Tiredness (lethargy).  · Fluid leaking from the ear.  · Ringing in the ear.    How is this diagnosed?  This condition is diagnosed with a physical exam. During the exam your health care provider will use an instrument called an otoscope to look into your ear and check for redness, swelling, and fluid. He or she will also ask about your symptoms.  Your health care provider may also order tests, such as:  · A test to check the movement of the eardrum (pneumatic otoscopy). This test is done by squeezing a small amount of air into the ear.  · A test that changes air pressure in the middle ear to check how well the eardrum moves and whether the eustachian tube is working (tympanogram).    How is this treated?  This condition usually goes away on its own within 3-5 days. But if the condition is caused by a bacteria infection and does not go away own its own, or keeps coming back, your health care provider may:  · Prescribe antibiotic medicines to treat the infection.  · Prescribe or  recommend medicines to control pain.    Follow these instructions at home:  · Take over-the-counter and prescription medicines only as told by your health care provider.  · If you were prescribed an antibiotic medicine, take it as told by your health care provider. Do not stop taking the antibiotic even if you start to feel better.  · Keep all follow-up visits as told by your health care provider. This is important.  Contact a health care provider if:  · You have bleeding from your nose.  · There is a lump on your neck.  · You are not getting better in 5 days.  · You feel worse instead of better.  Get help right away if:  · You have severe pain that is not controlled with medicine.  · You have swelling, redness, or pain around your ear.  · You have stiffness in your neck.  · A part of your face is paralyzed.  · The bone behind your ear (mastoid) is tender when you touch it.  · You develop a severe headache.  Summary  · Otitis media is redness, soreness, and swelling of the middle ear.  · This condition usually goes away on its own within 3-5 days.  · If the problem does not go away in 3-5 days, your health care provider may prescribe or recommend medicines to treat your symptoms.  · If you were prescribed an antibiotic medicine, take it as told by your health care provider.  This information is not intended to replace advice given to you by your health care provider. Make sure you discuss any questions you have with your health care provider.  Document Released: 09/22/2005 Document Revised: 12/08/2017 Document Reviewed: 12/08/2017  [employer] Interactive Patient Education © 2018 [employer]

## 2019-11-19 ENCOUNTER — OFFICE VISIT (OUTPATIENT)
Dept: RETAIL CLINIC | Facility: CLINIC | Age: 59
End: 2019-11-19

## 2019-11-19 VITALS
RESPIRATION RATE: 18 BRPM | OXYGEN SATURATION: 95 % | DIASTOLIC BLOOD PRESSURE: 62 MMHG | HEART RATE: 75 BPM | TEMPERATURE: 98.7 F | SYSTOLIC BLOOD PRESSURE: 110 MMHG

## 2019-11-19 DIAGNOSIS — B97.89 VIRAL RESPIRATORY ILLNESS: ICD-10-CM

## 2019-11-19 DIAGNOSIS — J98.8 VIRAL RESPIRATORY ILLNESS: ICD-10-CM

## 2019-11-19 DIAGNOSIS — H66.91 ACUTE OTITIS MEDIA, RIGHT: Primary | ICD-10-CM

## 2019-11-19 DIAGNOSIS — Z76.0 MEDICATION REFILL: ICD-10-CM

## 2019-11-19 PROBLEM — K21.9 GERD (GASTROESOPHAGEAL REFLUX DISEASE): Status: ACTIVE | Noted: 2019-02-26

## 2019-11-19 PROBLEM — E78.00 HIGH CHOLESTEROL: Status: ACTIVE | Noted: 2019-02-26

## 2019-11-19 PROBLEM — E66.01 MORBID OBESITY WITH BMI OF 40.0-44.9, ADULT (HCC): Status: ACTIVE | Noted: 2019-02-26

## 2019-11-19 PROBLEM — N39.3 STRESS INCONTINENCE: Status: ACTIVE | Noted: 2019-05-29

## 2019-11-19 PROBLEM — T14.8XXA WOUND INFECTION: Status: ACTIVE | Noted: 2019-02-26

## 2019-11-19 PROBLEM — F41.8 DEPRESSION WITH ANXIETY: Status: ACTIVE | Noted: 2019-02-26

## 2019-11-19 PROBLEM — Z99.89 OSA ON CPAP: Status: ACTIVE | Noted: 2019-01-10

## 2019-11-19 PROBLEM — G47.33 OSA ON CPAP: Status: ACTIVE | Noted: 2019-01-10

## 2019-11-19 PROBLEM — Z86.14 HISTORY OF MRSA INFECTION: Status: ACTIVE | Noted: 2019-02-26

## 2019-11-19 PROBLEM — L08.9 WOUND INFECTION: Status: ACTIVE | Noted: 2019-02-26

## 2019-11-19 PROCEDURE — 99213 OFFICE O/P EST LOW 20 MIN: CPT | Performed by: NURSE PRACTITIONER

## 2019-11-19 RX ORDER — ALBUTEROL SULFATE 90 UG/1
2 AEROSOL, METERED RESPIRATORY (INHALATION) EVERY 4 HOURS PRN
Qty: 1 INHALER | Refills: 0 | Status: SHIPPED | OUTPATIENT
Start: 2019-11-19 | End: 2019-12-19

## 2019-11-19 RX ORDER — TRIAMCINOLONE ACETONIDE 55 UG/1
2 SPRAY, METERED NASAL DAILY
COMMUNITY

## 2019-11-19 RX ORDER — BUPROPION HYDROCHLORIDE 150 MG/1
TABLET ORAL
COMMUNITY
Start: 2019-11-04

## 2019-11-19 RX ORDER — SIMVASTATIN 10 MG
TABLET ORAL
COMMUNITY
Start: 2019-11-04

## 2019-11-19 RX ORDER — CHOLECALCIFEROL (VITAMIN D3) 1250 MCG
50000 CAPSULE ORAL
COMMUNITY
Start: 2019-03-15

## 2019-11-19 RX ORDER — AMOXICILLIN 875 MG/1
875 TABLET, COATED ORAL EVERY 12 HOURS SCHEDULED
Qty: 20 TABLET | Refills: 0 | Status: SHIPPED | OUTPATIENT
Start: 2019-11-19 | End: 2019-11-29

## 2019-11-19 NOTE — PATIENT INSTRUCTIONS
Viral Respiratory Infection  A respiratory infection is an illness that affects part of the respiratory system, such as the lungs, nose, or throat. A respiratory infection that is caused by a virus is called a viral respiratory infection.  Common types of viral respiratory infections include:  · A cold.  · The flu (influenza).  · A respiratory syncytial virus (RSV) infection.  What are the causes?  This condition is caused by a virus.  What are the signs or symptoms?  Symptoms of this condition include:  · A stuffy or runny nose.  · Yellow or green nasal discharge.  · A cough.  · Sneezing.  · Fatigue.  · Achy muscles.  · A sore throat.  · Sweating or chills.  · A fever.  · A headache.  How is this diagnosed?  This condition may be diagnosed based on:  · Your symptoms.  · A physical exam.  · Testing of nasal swabs.  How is this treated?  This condition may be treated with medicines, such as:  · Antiviral medicine. This may shorten the length of time a person has symptoms.  · Expectorants. These make it easier to cough up mucus.  · Decongestant nasal sprays.  · Acetaminophen or NSAIDs to relieve fever and pain.  Antibiotic medicines are not prescribed for viral infections. This is because antibiotics are designed to kill bacteria. They are not effective against viruses.  Follow these instructions at home:    Managing pain and congestion  · Take over-the-counter and prescription medicines only as told by your health care provider.  · If you have a sore throat, gargle with a salt-water mixture 3-4 times a day or as needed. To make a salt-water mixture, completely dissolve ½-1 tsp of salt in 1 cup of warm water.  · Use nose drops made from salt water to ease congestion and soften raw skin around your nose.  · Drink enough fluid to keep your urine pale yellow. This helps prevent dehydration and helps loosen up mucus.  General instructions  · Rest as much as possible.  · Do not drink alcohol.  · Do not use any products  that contain nicotine or tobacco, such as cigarettes and e-cigarettes. If you need help quitting, ask your health care provider.  · Keep all follow-up visits as told by your health care provider. This is important.  How is this prevented?    · Get an annual flu shot. You may get the flu shot in late summer, fall, or winter. Ask your health care provider when you should get your flu shot.  · Avoid exposing others to your respiratory infection.  ? Stay home from work or school as told by your health care provider.  ? Wash your hands with soap and water often, especially after you cough or sneeze. If soap and water are not available, use alcohol-based hand .  · Avoid contact with people who are sick during cold and flu season. This is generally fall and winter.  Contact a health care provider if:  · Your symptoms last for 10 days or longer.  · Your symptoms get worse over time.  · You have a fever.  · You have severe sinus pain in your face or forehead.  · The glands in your jaw or neck become very swollen.  Get help right away if you:  · Feel pain or pressure in your chest.  · Have shortness of breath.  · Faint or feel like you will faint.  · Have severe and persistent vomiting.  · Feel confused or disoriented.  Summary  · A respiratory infection is an illness that affects part of the respiratory system, such as the lungs, nose, or throat. A respiratory infection that is caused by a virus is called a viral respiratory infection.  · Common types of viral respiratory infections are a cold, influenza, and respiratory syncytial virus (RSV) infection.  · Symptoms of this condition include a stuffy or runny nose, cough, sneezing, fatigue, achy muscles, sore throat, and fevers or chills.  · Antibiotic medicines are not prescribed for viral infections. This is because antibiotics are designed to kill bacteria. They are not effective against viruses.  This information is not intended to replace advice given to you by  your health care provider. Make sure you discuss any questions you have with your health care provider.  Document Released: 09/27/2006 Document Revised: 01/28/2019 Document Reviewed: 01/28/2019  [employer] Interactive Patient Education © 2019 [employer]  Otitis Media, Adult    Otitis media occurs when there is inflammation and fluid in the middle ear. Your middle ear is a part of the ear that contains bones for hearing as well as air that helps send sounds to your brain.  What are the causes?  This condition is caused by a blockage in the eustachian tube. This tube drains fluid from the ear to the back of the nose (nasopharynx). A blockage in this tube can be caused by an object or by swelling (edema) in the tube. Problems that can cause a blockage include:  · A cold or other upper respiratory infection.  · Allergies.  · An irritant, such as tobacco smoke.  · Enlarged adenoids. The adenoids are areas of soft tissue located high in the back of the throat, behind the nose and the roof of the mouth.  · A mass in the nasopharynx.  · Damage to the ear caused by pressure changes (barotrauma).  What are the signs or symptoms?  Symptoms of this condition include:  · Ear pain.  · A fever.  · Decreased hearing.  · A headache.  · Tiredness (lethargy).  · Fluid leaking from the ear.  · Ringing in the ear.  How is this diagnosed?  This condition is diagnosed with a physical exam. During the exam your health care provider will use an instrument called an otoscope to look into your ear and check for redness, swelling, and fluid. He or she will also ask about your symptoms.  Your health care provider may also order tests, such as:  · A test to check the movement of the eardrum (pneumatic otoscopy). This test is done by squeezing a small amount of air into the ear.  · A test that changes air pressure in the middle ear to check how well the eardrum moves and whether the eustachian tube is working (tympanogram).  How is this  treated?  This condition usually goes away on its own within 3-5 days. But if the condition is caused by a bacteria infection and does not go away own its own, or keeps coming back, your health care provider may:  · Prescribe antibiotic medicines to treat the infection.  · Prescribe or recommend medicines to control pain.  Follow these instructions at home:  · Take over-the-counter and prescription medicines only as told by your health care provider.  · If you were prescribed an antibiotic medicine, take it as told by your health care provider. Do not stop taking the antibiotic even if you start to feel better.  · Keep all follow-up visits as told by your health care provider. This is important.  Contact a health care provider if:  · You have bleeding from your nose.  · There is a lump on your neck.  · You are not getting better in 5 days.  · You feel worse instead of better.  Get help right away if:  · You have severe pain that is not controlled with medicine.  · You have swelling, redness, or pain around your ear.  · You have stiffness in your neck.  · A part of your face is paralyzed.  · The bone behind your ear (mastoid) is tender when you touch it.  · You develop a severe headache.  Summary  · Otitis media is redness, soreness, and swelling of the middle ear.  · This condition usually goes away on its own within 3-5 days.  · If the problem does not go away in 3-5 days, your health care provider may prescribe or recommend medicines to treat your symptoms.  · If you were prescribed an antibiotic medicine, take it as told by your health care provider.  This information is not intended to replace advice given to you by your health care provider. Make sure you discuss any questions you have with your health care provider.  Document Released: 09/22/2005 Document Revised: 12/08/2017 Document Reviewed: 12/08/2017  [employer] Interactive Patient Education © 2019 [employer]

## 2019-11-19 NOTE — PROGRESS NOTES
Subjective   Patient ID: Amanda Tom is a 59 y.o. female presents with   Chief Complaint   Patient presents with   • URI       URI    This is a new problem. The current episode started yesterday. The problem has been gradually worsening. There has been no fever. Associated symptoms include congestion, ear pain, headaches, nausea, a sore throat (8/10 pain) and wheezing. Pertinent negatives include no coughing, diarrhea, rhinorrhea, sneezing or vomiting. She has tried acetaminophen (nasacort) for the symptoms. The treatment provided mild relief.       No Known Allergies    The following portions of the patient's history were reviewed and updated as appropriate: allergies, current medications, past family history, past medical history, past social history, past surgical history and problem list.      Review of Systems   Constitutional: Positive for chills, diaphoresis and fatigue. Negative for fever.   HENT: Positive for congestion, ear pain, postnasal drip, sinus pressure and sore throat (8/10 pain). Negative for rhinorrhea, sneezing and trouble swallowing.    Respiratory: Positive for wheezing. Negative for cough, chest tightness and shortness of breath.    Cardiovascular: Negative.    Gastrointestinal: Positive for nausea. Negative for diarrhea and vomiting.   Neurological: Positive for headaches.       Objective     Vitals:    11/19/19 1525   BP: 110/62   Pulse: 75   Resp: 18   Temp: 98.7 °F (37.1 °C)   SpO2: 95%         Physical Exam   Constitutional: She is oriented to person, place, and time. She appears well-developed and well-nourished. She does not appear ill. No distress.   HENT:   Head: Normocephalic.   Right Ear: Hearing, external ear and ear canal normal. Tympanic membrane is erythematous.   Left Ear: Hearing, external ear and ear canal normal.   Nose: Mucosal edema and sinus tenderness present. No rhinorrhea.   Mouth/Throat: Mucous membranes are normal. Posterior oropharyngeal erythema present. Tonsils  are 2+ on the right. Tonsils are 2+ on the left. No tonsillar exudate.   Right tm not visualized due to cerumen.   Eyes: Conjunctivae are normal.   Sclera white.   Neck: No tracheal deviation present.   Cardiovascular: Normal rate, regular rhythm, S1 normal, S2 normal and normal heart sounds.   Pulmonary/Chest: Effort normal and breath sounds normal. No accessory muscle usage. No respiratory distress.   Abdominal: Soft. Bowel sounds are normal. There is no tenderness.   Lymphadenopathy:     She has no cervical adenopathy.   Neurological: She is alert and oriented to person, place, and time.   Skin: Skin is warm and dry.   Vitals reviewed.        Amanda was seen today for uri.    Diagnoses and all orders for this visit:    Acute otitis media, right  -     amoxicillin (AMOXIL) 875 MG tablet; Take 1 tablet by mouth Every 12 (Twelve) Hours for 10 days.    Viral respiratory illness    Medication refill  -     albuterol sulfate  (90 Base) MCG/ACT inhaler; Inhale 2 puffs Every 4 (Four) Hours As Needed for Wheezing or Shortness of Air for up to 30 days.        Patient understands possible side effects of all medications ordered. Follow-up with Primary Care Physician in 48-72 hours if these symptoms worsen or fail to improve as anticipated. Patient verbalizes understanding.    Viral Respiratory Infection  A respiratory infection is an illness that affects part of the respiratory system, such as the lungs, nose, or throat. A respiratory infection that is caused by a virus is called a viral respiratory infection.  Common types of viral respiratory infections include:  · A cold.  · The flu (influenza).  · A respiratory syncytial virus (RSV) infection.  What are the causes?  This condition is caused by a virus.  What are the signs or symptoms?  Symptoms of this condition include:  · A stuffy or runny nose.  · Yellow or green nasal discharge.  · A cough.  · Sneezing.  · Fatigue.  · Achy muscles.  · A sore  throat.  · Sweating or chills.  · A fever.  · A headache.  How is this diagnosed?  This condition may be diagnosed based on:  · Your symptoms.  · A physical exam.  · Testing of nasal swabs.  How is this treated?  This condition may be treated with medicines, such as:  · Antiviral medicine. This may shorten the length of time a person has symptoms.  · Expectorants. These make it easier to cough up mucus.  · Decongestant nasal sprays.  · Acetaminophen or NSAIDs to relieve fever and pain.  Antibiotic medicines are not prescribed for viral infections. This is because antibiotics are designed to kill bacteria. They are not effective against viruses.  Follow these instructions at home:    Managing pain and congestion  · Take over-the-counter and prescription medicines only as told by your health care provider.  · If you have a sore throat, gargle with a salt-water mixture 3-4 times a day or as needed. To make a salt-water mixture, completely dissolve ½-1 tsp of salt in 1 cup of warm water.  · Use nose drops made from salt water to ease congestion and soften raw skin around your nose.  · Drink enough fluid to keep your urine pale yellow. This helps prevent dehydration and helps loosen up mucus.  General instructions  · Rest as much as possible.  · Do not drink alcohol.  · Do not use any products that contain nicotine or tobacco, such as cigarettes and e-cigarettes. If you need help quitting, ask your health care provider.  · Keep all follow-up visits as told by your health care provider. This is important.  How is this prevented?    · Get an annual flu shot. You may get the flu shot in late summer, fall, or winter. Ask your health care provider when you should get your flu shot.  · Avoid exposing others to your respiratory infection.  ? Stay home from work or school as told by your health care provider.  ? Wash your hands with soap and water often, especially after you cough or sneeze. If soap and water are not available,  use alcohol-based hand .  · Avoid contact with people who are sick during cold and flu season. This is generally fall and winter.  Contact a health care provider if:  · Your symptoms last for 10 days or longer.  · Your symptoms get worse over time.  · You have a fever.  · You have severe sinus pain in your face or forehead.  · The glands in your jaw or neck become very swollen.  Get help right away if you:  · Feel pain or pressure in your chest.  · Have shortness of breath.  · Faint or feel like you will faint.  · Have severe and persistent vomiting.  · Feel confused or disoriented.  Summary  · A respiratory infection is an illness that affects part of the respiratory system, such as the lungs, nose, or throat. A respiratory infection that is caused by a virus is called a viral respiratory infection.  · Common types of viral respiratory infections are a cold, influenza, and respiratory syncytial virus (RSV) infection.  · Symptoms of this condition include a stuffy or runny nose, cough, sneezing, fatigue, achy muscles, sore throat, and fevers or chills.  · Antibiotic medicines are not prescribed for viral infections. This is because antibiotics are designed to kill bacteria. They are not effective against viruses.  This information is not intended to replace advice given to you by your health care provider. Make sure you discuss any questions you have with your health care provider.  Document Released: 09/27/2006 Document Revised: 01/28/2019 Document Reviewed: 01/28/2019  [employer] Interactive Patient Education © 2019 [employer]  Otitis Media, Adult    Otitis media occurs when there is inflammation and fluid in the middle ear. Your middle ear is a part of the ear that contains bones for hearing as well as air that helps send sounds to your brain.  What are the causes?  This condition is caused by a blockage in the eustachian tube. This tube drains fluid from the ear to the back of the nose (nasopharynx). A  blockage in this tube can be caused by an object or by swelling (edema) in the tube. Problems that can cause a blockage include:  · A cold or other upper respiratory infection.  · Allergies.  · An irritant, such as tobacco smoke.  · Enlarged adenoids. The adenoids are areas of soft tissue located high in the back of the throat, behind the nose and the roof of the mouth.  · A mass in the nasopharynx.  · Damage to the ear caused by pressure changes (barotrauma).  What are the signs or symptoms?  Symptoms of this condition include:  · Ear pain.  · A fever.  · Decreased hearing.  · A headache.  · Tiredness (lethargy).  · Fluid leaking from the ear.  · Ringing in the ear.  How is this diagnosed?  This condition is diagnosed with a physical exam. During the exam your health care provider will use an instrument called an otoscope to look into your ear and check for redness, swelling, and fluid. He or she will also ask about your symptoms.  Your health care provider may also order tests, such as:  · A test to check the movement of the eardrum (pneumatic otoscopy). This test is done by squeezing a small amount of air into the ear.  · A test that changes air pressure in the middle ear to check how well the eardrum moves and whether the eustachian tube is working (tympanogram).  How is this treated?  This condition usually goes away on its own within 3-5 days. But if the condition is caused by a bacteria infection and does not go away own its own, or keeps coming back, your health care provider may:  · Prescribe antibiotic medicines to treat the infection.  · Prescribe or recommend medicines to control pain.  Follow these instructions at home:  · Take over-the-counter and prescription medicines only as told by your health care provider.  · If you were prescribed an antibiotic medicine, take it as told by your health care provider. Do not stop taking the antibiotic even if you start to feel better.  · Keep all follow-up visits  as told by your health care provider. This is important.  Contact a health care provider if:  · You have bleeding from your nose.  · There is a lump on your neck.  · You are not getting better in 5 days.  · You feel worse instead of better.  Get help right away if:  · You have severe pain that is not controlled with medicine.  · You have swelling, redness, or pain around your ear.  · You have stiffness in your neck.  · A part of your face is paralyzed.  · The bone behind your ear (mastoid) is tender when you touch it.  · You develop a severe headache.  Summary  · Otitis media is redness, soreness, and swelling of the middle ear.  · This condition usually goes away on its own within 3-5 days.  · If the problem does not go away in 3-5 days, your health care provider may prescribe or recommend medicines to treat your symptoms.  · If you were prescribed an antibiotic medicine, take it as told by your health care provider.  This information is not intended to replace advice given to you by your health care provider. Make sure you discuss any questions you have with your health care provider.  Document Released: 09/22/2005 Document Revised: 12/08/2017 Document Reviewed: 12/08/2017  [employer] Interactive Patient Education © 2019 [employer]

## 2020-03-17 DIAGNOSIS — Z76.0 MEDICATION REFILL: ICD-10-CM

## 2020-03-17 RX ORDER — ALBUTEROL SULFATE 90 UG/1
AEROSOL, METERED RESPIRATORY (INHALATION)
Qty: 18 G | Refills: 0 | OUTPATIENT
Start: 2020-03-17